# Patient Record
Sex: MALE | Race: WHITE | URBAN - METROPOLITAN AREA
[De-identification: names, ages, dates, MRNs, and addresses within clinical notes are randomized per-mention and may not be internally consistent; named-entity substitution may affect disease eponyms.]

---

## 2017-02-10 ENCOUNTER — OFFICE VISIT (OUTPATIENT)
Dept: FAMILY MEDICINE | Facility: CLINIC | Age: 32
End: 2017-02-10

## 2017-02-10 VITALS — SYSTOLIC BLOOD PRESSURE: 132 MMHG | DIASTOLIC BLOOD PRESSURE: 74 MMHG | OXYGEN SATURATION: 98 % | HEART RATE: 91 BPM

## 2017-02-10 DIAGNOSIS — Z23 ENCOUNTER FOR IMMUNIZATION: ICD-10-CM

## 2017-02-10 DIAGNOSIS — S61.219A LACERATION OF FINGER, INITIAL ENCOUNTER: Primary | ICD-10-CM

## 2017-02-10 PROCEDURE — 12001 RPR S/N/AX/GEN/TRNK 2.5CM/<: CPT | Performed by: FAMILY MEDICINE

## 2017-02-10 PROCEDURE — 90715 TDAP VACCINE 7 YRS/> IM: CPT | Performed by: FAMILY MEDICINE

## 2017-02-10 PROCEDURE — 90471 IMMUNIZATION ADMIN: CPT | Performed by: FAMILY MEDICINE

## 2017-02-10 RX ORDER — SULFAMETHOXAZOLE/TRIMETHOPRIM 800-160 MG
1 TABLET ORAL 2 TIMES DAILY
Qty: 14 TABLET | Refills: 0 | Status: SHIPPED | OUTPATIENT
Start: 2017-02-10 | End: 2018-04-05

## 2017-02-10 NOTE — MR AVS SNAPSHOT
"              After Visit Summary   2/10/2017    Jorge L Richardson    MRN: [id number]           Patient Information     Date Of Birth          1985        Visit Information        Provider Department      2/10/2017 9:20 AM Konrad Rogers MD [facility]        Today's Diagnoses     Laceration of finger, initial encounter    -  1     Encounter for immunization            Follow-ups after your visit        Who to contact     If you have questions or need follow up information about today's clinic visit or your schedule please contact [facility] directly at [phone].  Normal or non-critical lab and imaging results will be communicated to you by Cignifihart, letter or phone within 4 business days after the clinic has received the results. If you do not hear from us within 7 days, please contact the clinic through Cignifihart or phone. If you have a critical or abnormal lab result, we will notify you by phone as soon as possible.  Submit refill requests through Modustri or call your pharmacy and they will forward the refill request to us. Please allow 3 business days for your refill to be completed.          Additional Information About Your Visit        MyChart Information     Modustri lets you send messages to your doctor, view your test results, renew your prescriptions, schedule appointments and more. To sign up, go to [email or web address] . Click on \"Log in\" on the left side of the screen, which will take you to the Welcome page. Then click on \"Sign up Now\" on the right side of the page.     You will be asked to enter the access code listed below, as well as some personal information. Please follow the directions to create your username and password.     Your access code is: [email or web address]  Expires: 2017 10:48 AM     Your access code will  in 90 days. If you need help or a new code, please call your [facility] or [phone].        Care EveryWhere ID     " This is your Care EveryWhere ID. This could be used by other organizations to access your [facility] medical records  [id number]        Your Vitals Were     Pulse Pulse Oximetry                91 98%           Blood Pressure from Last 3 Encounters:   02/10/17 132/74   04/02/14 112/64   06/07/12 130/80    Weight from Last 3 Encounters:   04/02/14 176 lb (79.833 kg)   06/07/12 176 lb (79.833 kg)              We Performed the Following     TDAP (ADACEL AGES 11-64)     VACCINE ADMINISTRATION, INITIAL          Today's Medication Changes          These changes are accurate as of: 2/10/17 10:48 AM.  If you have any questions, ask your nurse or doctor.               Start taking these medicines.        Dose/Directions    sulfamethoxazole-trimethoprim 800-160 MG per tablet   Commonly known as:  BACTRIM DS/SEPTRA DS   Used for:  Encounter for immunization        Dose:  1 tablet   Take 1 tablet by mouth 2 times daily   Quantity:  14 tablet   Refills:  0            Where to get your medicines      These medications were sent to [facility] - [address] [address]     Phone:  [phone]    - sulfamethoxazole-trimethoprim 800-160 MG per tablet             Primary Care Provider    None Specified       No primary provider on file.        Thank you!     Thank you for choosing [facility]  for your care. Our goal is always to provide you with excellent care. Hearing back from our patients is one way we can continue to improve our services. Please take a few minutes to complete the written survey that you may receive in the mail after your visit with us. Thank you!             Your Updated Medication List - Protect others around you: Learn how to safely use, store and throw away your medicines at www.disposemymeds.org.          This list is accurate as of: 2/10/17 10:48 AM.  Always use your most recent med list.                    Brand Name Dispense Instructions for use    sulfamethoxazole-trimethoprim 800-160 MG per tablet    BACTRIM DS/SEPTRA DS    14 tablet    Take 1 tablet by mouth 2 times daily

## 2017-02-10 NOTE — Clinical Note
[address] [address]  [phone]    02/10/2017    Jorge L Richardson,  1985  [address] [facility] [address]      Employer:  [employer]     Date of Treatment: 02/10/2017    Date of Incident/Injury: 2/10/2017    Diagnosis:   Finger Laceration    Work Related:  yes      The employee is ABLE to return to work today.     When the patient returns to work, the following restrictions apply until stitches are removed :    *  The employee must keep the injured site clean and dry.    FOLLOW-UP  Follow up with 10-14 days to have stitches removed.                  Sánchez Rogers M.D.

## 2017-02-10 NOTE — PROGRESS NOTES
SUBJECTIVE:                                                    Jorge L Richardson is a 31 year old male who presents to clinic today for the following health issues:    Concern - finger laceration     Onset: 8 am finger laceration at work     Description:   Pt cut finger at auger at work ([employer]), left pinkie finger, pt able to move and bend finger, actively bleeding upon arrival, pressure applied and bleeding stopped    Intensity: moderate    Progression of Symptoms:  improving    Accompanying Signs & Symptoms:  Pt able to move and bend finger - sensation is intact distally too  Numbness: no        Tetanus shot given     Problem list and histories reviewed & adjusted, as indicated.  Additional history: as documented    ROS:  Constitutional, HEENT, cardiovascular, pulmonary, GI, , musculoskeletal, neuro, skin, endocrine and psych systems are negative, except as otherwise noted.    This document serves as a record of the services and decisions personally performed and made by Konrad Rogers MD. It was created on his behalf by Tori Vaughan, a trained medical scribe. The creation of this document is based the provider's statements to the medical scribe.  Tori Vaughan   OBJECTIVE:                                                    /74 mmHg  Pulse 91  SpO2 98% There is no weight on file to calculate BMI.   GENERAL: healthy, alert, well nourished, well hydrated, no distress  EYES: Eyes grossly normal to inspection, extraocular movements - intact, and PERRL  NECK: no tenderness, no adenopathy, no asymmetry, no masses, no stiffness; thyroid- normal to palpation  SKIN: 2.5 cm laceration on 5th finger radial aspect from DIP to fat pad on left hand otherwise, no suspicious lesions, no rashes   PSYCH: Alert and oriented times 3; speech- coherent , normal rate and volume; able to articulate logical thoughts, able to abstract reason, no tangential thoughts, no hallucinations or delusions,  affect- normal  Diagnostic test results: none     ASSESSMENT/PLAN:         Jorge L was seen today for finger.    Diagnoses and all orders for this visit:    Laceration of finger, initial encounter: Advised to keep dry and clean  -     VACCINE ADMINISTRATION, INITIAL  -     sulfamethoxazole-trimethoprim (BACTRIM DS/SEPTRA DS) 800-160 MG per tablet; Take 1 tablet by mouth 2 times daily        After informed consent was obtained, using Hibiclens for cleansing and 1% Lidocaine without epinephrine for anesthetic, with sterile technique, suturing was performed with interrupted 5-0 Ethilon sutures . Antibiotic dressing is applied, and wound care instructions provided.  Be alert for any signs of cutaneous infection. The procedure was well tolerated without complications. Follow up:  Return for suture removal in 10 days or prm.      Encounter for immunization  -     TDAP (ADACEL AGES 11-64)        Risks, benefits and alternatives of treatments discussed. Plan agreed on.      Followup: 10 days for suture removal    Will call, return to clinic, or go to ED if worsening or symptoms not improving as discussed.    See patient instructions.     Health Maintenance Topics with due status: Overdue       Topic Date Due    INFLUENZA VACCINE (SYSTEM ASSIGNED) 09/01/2016    TETANUS IMMUNIZATION (SYSTEM ASSIGNED) 11/17/2016       Health maintenance reviewed/updated? Yes    The information in this document, created by the medical scribe for me, accurately reflects the services I personally performed and the decisions made by me. I have reviewed and approved this document for accuracy prior to leaving the patient care area.  Konrad Rogers MD February 10, 2017 3:37 PM        Sánchez Rogers MD

## 2017-02-10 NOTE — PROGRESS NOTES
Concern - finger laceration     Onset: 8 am finger laceration at work     Description:   Pt cut finger at auger at work ([employer]), left pinkie finger, pt able to move and bend finger, actively bleeding upon arrival, pressure applied and bleeding stopped    Intensity: moderate    Progression of Symptoms:  improving    Accompanying Signs & Symptoms:  Pt able to move and bend finger - sensation is intact distally too  Numbness: no        Tetanus shot given     Skin: 2.5 cm laceration on 5th finger radial aspect from DIP to fat pad on left hand    Jorge L was seen today for finger.    Diagnoses and all orders for this visit:    Laceration of finger, initial encounter: Pt advised to keep area dry and clean.  -     VACCINE ADMINISTRATION, INITIAL    Encounter for immunization  -     TDAP (ADACEL AGES 11-64)  -     sulfamethoxazole-trimethoprim (BACTRIM DS/SEPTRA DS) 800-160 MG per tablet; Take 1 tablet by mouth 2 times daily    After informed consent was obtained, using Hibiclens for cleansing and 1% Lidocaine without epinephrine for anesthetic, with sterile technique, suturing was performed. Antibiotic dressing is applied, and wound care instructions provided.  Be alert for any signs of cutaneous infection. The procedure was well tolerated without complications. Follow up:  Return for suture removal in 10 days or prm.    The information in this document, created by the medical scribe for me, accurately reflects the services I personally performed and the decisions made by me. I have reviewed and approved this document for accuracy prior to leaving the patient care area.  Konrad Rogers MD February 10, 2017 10:41 AM    Katlin Gonzalez RN, BSN   [facility]